# Patient Record
Sex: FEMALE | Race: BLACK OR AFRICAN AMERICAN | NOT HISPANIC OR LATINO
[De-identification: names, ages, dates, MRNs, and addresses within clinical notes are randomized per-mention and may not be internally consistent; named-entity substitution may affect disease eponyms.]

---

## 2021-05-05 PROBLEM — Z00.00 ENCOUNTER FOR PREVENTIVE HEALTH EXAMINATION: Status: ACTIVE | Noted: 2021-05-05

## 2021-05-11 ENCOUNTER — APPOINTMENT (OUTPATIENT)
Dept: PLASTIC SURGERY | Facility: CLINIC | Age: 36
End: 2021-05-11

## 2023-12-23 ENCOUNTER — EMERGENCY (EMERGENCY)
Facility: HOSPITAL | Age: 38
LOS: 1 days | Discharge: HOME | End: 2023-12-23
Attending: STUDENT IN AN ORGANIZED HEALTH CARE EDUCATION/TRAINING PROGRAM | Admitting: STUDENT IN AN ORGANIZED HEALTH CARE EDUCATION/TRAINING PROGRAM
Payer: COMMERCIAL

## 2023-12-23 VITALS
HEART RATE: 105 BPM | HEIGHT: 63 IN | RESPIRATION RATE: 18 BRPM | TEMPERATURE: 101 F | WEIGHT: 199.96 LBS | OXYGEN SATURATION: 99 % | DIASTOLIC BLOOD PRESSURE: 83 MMHG | SYSTOLIC BLOOD PRESSURE: 128 MMHG

## 2023-12-23 DIAGNOSIS — Z98.891 HISTORY OF UTERINE SCAR FROM PREVIOUS SURGERY: Chronic | ICD-10-CM

## 2023-12-23 DIAGNOSIS — U07.1 COVID-19: ICD-10-CM

## 2023-12-23 DIAGNOSIS — J06.9 ACUTE UPPER RESPIRATORY INFECTION, UNSPECIFIED: ICD-10-CM

## 2023-12-23 DIAGNOSIS — R05.9 COUGH, UNSPECIFIED: ICD-10-CM

## 2023-12-23 LAB
RAPID RVP RESULT: DETECTED
RAPID RVP RESULT: DETECTED
SARS-COV-2 RNA SPEC QL NAA+PROBE: DETECTED
SARS-COV-2 RNA SPEC QL NAA+PROBE: DETECTED

## 2023-12-23 PROCEDURE — 0225U NFCT DS DNA&RNA 21 SARSCOV2: CPT

## 2023-12-23 PROCEDURE — 99283 EMERGENCY DEPT VISIT LOW MDM: CPT

## 2023-12-23 RX ORDER — ACETAMINOPHEN 500 MG
1000 TABLET ORAL ONCE
Refills: 0 | Status: COMPLETED | OUTPATIENT
Start: 2023-12-23 | End: 2023-12-23

## 2023-12-23 RX ADMIN — Medication 1000 MILLIGRAM(S): at 21:54

## 2023-12-23 NOTE — ED PROVIDER NOTE - OBJECTIVE STATEMENT
39 yo fem without signif pmhx c/o cough and congestion.  States daughter with URI/cough for past week and tested neg for Covid 3 days ago.  The patient began to develop cough, nasal congestion and feeling feverish 2 days ago; vomited once yesterday and once the previous day.  NB/NB emesis. No diarrhea, melena, or abd pain.  No chest pain, except for mild discomfort when she coughs.  The cough has been essentially nonproductive, except for one episode of clear sputum in the morning, no hemoptysis.  No shortness of breath. No hx of heart disease, lung disease, DVT/PE, HLD.  States she possibly has HTN, but that iit is being watched, not on BP meds.  Hx left ankle surgery last spring, has some exacerbation of the left ankle pain, but no LE edema or calf pain.  Has not taken anything for symptoms.  She denies any possibility of pregnancy.

## 2023-12-23 NOTE — ED PROVIDER NOTE - NSFOLLOWUPINSTRUCTIONS_ED_ALL_ED_FT
You can take acetaminophen (Tylenol) or ibuprofen (Advil/Motrin) if needed for pain/fever.     Since daughter has Covid, we must assume that you have it as well.  Rest at home, isolation for at least 5 days and until symptoms improving, and no fever without the use of fever-reducing meds (ie acetaminophen, ibuprofen, naproxen, diclofenac).     Stay well-hydrated.    Follow up with your doctor after the weekend.  Return to the Emergency Department if you have any new or worsening symptoms, or if you have any concerns.  =============  Viral Illness, Adult  Viruses are tiny germs that can get into a person's body and cause illness. There are many different types of viruses, and they cause many types of illness. Viral illnesses can range from mild to severe. They can affect various parts of the body.    Short-term conditions that are caused by a virus include colds and the flu (influenza). Long-term conditions that are caused by a virus include herpes, shingles, and HIV (human immunodeficiency virus) infection. A few viruses have been linked to certain cancers.    What are the causes?  Many types of viruses can cause illness. Viruses invade cells in your body, multiply, and cause the infected cells to work abnormally or die. When these cells die, they release more of the virus. When this happens, you develop symptoms of the illness, and the virus continues to spread to other cells. If the virus takes over the function of the cell, it can cause the cell to divide and grow out of control. This happens when a virus causes cancer.    Different viruses get into the body in different ways. You can get a virus by:  Swallowing food or water that has come in contact with the virus (is contaminated).  Breathing in droplets that have been coughed or sneezed into the air by an infected person.  Touching a surface that has been contaminated with the virus and then touching your eyes, nose, or mouth.  Being bitten by an insect or animal that carries the virus.  Having sexual contact with a person who is infected with the virus.  Being exposed to blood or fluids that contain the virus, either through an open cut or during a transfusion.  If a virus enters your body, your body's defense system (immune system) will try to fight the virus. You may be at higher risk for a viral illness if your immune system is weak.    What are the signs or symptoms?  You may have these symptoms, depending on the type of virus and the location of the cells that it invades:  Cold and flu viruses:  Fever.  Headache.  Sore throat.  Muscle aches.  Stuffy nose (nasal congestion).  Cough.  Digestive system (gastrointestinal) viruses:  Fever.  Pain in the abdomen.  Nausea.  Diarrhea.  Liver viruses (hepatitis):  Loss of appetite.  Tiredness.  Skin or the white parts of your eyes turning yellow (jaundice).  Brain and spinal cord viruses:  Fever.  Headache.  Stiff neck.  Nausea and vomiting.  Confusion or sleepiness.  Skin viruses:  Warts.  Itching.  Rash.  Sexually transmitted viruses:  Discharge.  Swelling.  Redness.  Rash.  How is this diagnosed?  This condition may be diagnosed based on one or more of the following:  Symptoms.  Medical history.  Physical exam.  Blood test, sample of mucus from your lungs (sputum sample), stool sample, or a swab of body fluids or a skin sore (lesion).  How is this treated?  Viruses can be hard to treat because they live within cells. Antibiotic medicines do not treat viruses because these medicines do not get inside cells. Treatment for a viral illness may include:  Resting and drinking plenty of fluids.  Medicines to relieve symptoms. These can include over-the-counter medicine for pain and fever, medicines for cough or congestion, and medicines to relieve diarrhea.  Antiviral medicines. These medicines are available only for certain types of viruses.  Some viral illnesses can be prevented with vaccinations. A common example is the flu shot.    Follow these instructions at home:  Medicines    Take over-the-counter and prescription medicines only as told by your health care provider.  If you were prescribed an antiviral medicine, take it as told by your health care provider. Do not stop taking the antiviral even if you start to feel better.  Be aware of when antibiotics are needed and when they are not needed. Antibiotics do not treat viruses. You may get an antibiotic if your health care provider thinks that you may have, or are at risk for, a bacterial infection and you have a viral infection.  Do not ask for an antibiotic prescription if you have been diagnosed with a viral illness. Antibiotics will not make your illness go away faster.  Frequently taking antibiotics when they are not needed can lead to antibiotic resistance. When this develops, the medicine no longer works against the bacteria that it normally fights.  General instructions      Drink enough fluids to keep your urine pale yellow.  Rest as much as possible.  Return to your normal activities as told by your health care provider. Ask your health care provider what activities are safe for you.  Keep all follow-up visits as told by your health care provider. This is important.  How is this prevented?    To reduce your risk of viral illness:  Wash your hands often with soap and water for at least 20 seconds. If soap and water are not available, use hand .  Avoid touching your nose, eyes, and mouth, especially if you have not washed your hands recently.  If anyone in your household has a viral infection, clean all household surfaces that may have been in contact with the virus. Use soap and hot water. You may also use bleach that you have added water to (diluted).  Stay away from people who are sick with symptoms of a viral infection.  Do not share items such as toothbrushes and water bottles with other people.  Keep your vaccinations up to date. This includes getting a yearly flu shot.  Eat a healthy diet and get plenty of rest.  Contact a health care provider if:  You have symptoms of a viral illness that do not go away.  Your symptoms come back after going away.  Your symptoms get worse.  Get help right away if you have:  Trouble breathing.  A severe headache or a stiff neck.  Severe vomiting or pain in your abdomen.  These symptoms may represent a serious problem that is an emergency. Do not wait to see if the symptoms will go away. Get medical help right away. Call your local emergency services (911 in the [place] Do not drive yourself to the hospital.    Summary  Viruses are types of germs that can get into a person's body and cause illness. Viral illnesses can range from mild to severe. They can affect various parts of the body.  Viruses can be hard to treat. There are medicines to relieve symptoms, and there are some antiviral medicines.  If you were prescribed an antiviral medicine, take it as told by your health care provider. Do not stop taking the antiviral even if you start to feel better.  Contact a health care provider if you have symptoms of a viral illness that do not go away.  This information is not intended to replace advice given to you by your health care provider. Make sure you discuss any questions you have with your health care provider.

## 2023-12-23 NOTE — ED PROVIDER NOTE - NS ED ATTENDING STATEMENT MOD
This was a shared visit with the MITZI. I reviewed and verified the documentation and independently performed the documented:

## 2023-12-23 NOTE — ED ADULT NURSE NOTE - NSFALLUNIVINTERV_ED_ALL_ED
Bed/Stretcher in lowest position, wheels locked, appropriate side rails in place/Call bell, personal items and telephone in reach/Instruct patient to call for assistance before getting out of bed/chair/stretcher/Non-slip footwear applied when patient is off stretcher/[place] to call system/Physically safe environment - no spills, clutter or unnecessary equipment/Purposeful proactive rounding/Room/bathroom lighting operational, light cord in reach Bed/Stretcher in lowest position, wheels locked, appropriate side rails in place/Call bell, personal items and telephone in reach/Instruct patient to call for assistance before getting out of bed/chair/stretcher/Non-slip footwear applied when patient is off stretcher/[place] to call system/Physically safe environment - no spills, clutter or unnecessary equipment/Purposeful proactive rounding/Room/bathroom lighting operational, light cord in reach

## 2023-12-23 NOTE — ED PROVIDER NOTE - PATIENT PORTAL LINK FT
You can access the FollowMyHealth Patient Portal offered by [facility] by registering at the following website: [email or web address]. By joining [employer]’s FollowMyHealth portal, you will also be able to view your health information using other applications (apps) compatible with our system. You can access the FollowMyHealth Patient Portal offered by [facility] by registering at the following website: [email or web address]. By joining [employer]’s FollowMyHealth portal, you will also be able to view your health information using other applications (apps) compatible with our system.

## 2023-12-23 NOTE — ED PROVIDER NOTE - PHYSICAL EXAMINATION
CONSTITUTIONAL: NAD   SKIN: Normal color and turgor.    HEAD: NC/AT.  EYES: Conjunctiva clear. Anicteric sclera.  ENT: Airway clear. Normal voice.   RESPIRATORY:  Normal work of breathing. Lungs CTAB.  CARDIOVASCULAR:  RRR, S1S2. No M/R/G.      GI:  Abdomen soft, nontender.    MSK: Neck supple.  Healed surgical scars to left ankle, minimal ankle swelling, no erythema/warmth/limited ROM.  No calf tenderness or edema.   NEURO: Alert; CN: grossly intact. Speech clear.  PIMENTEL. Gait steady.

## 2023-12-23 NOTE — ED ADULT NURSE NOTE - OBJECTIVE STATEMENT
38 y f, complaining of cough x 2 days, no medical hx, endorsed her daughter recently diagnosed with covid, and pt has run out of covid test, would like to get tested. denies chest pain, sob, nausea, vomiting. febrile in triage. pt appears well.

## 2025-01-23 ENCOUNTER — EMERGENCY (EMERGENCY)
Facility: HOSPITAL | Age: 40
LOS: 1 days | Discharge: HOME | End: 2025-01-23
Admitting: STUDENT IN AN ORGANIZED HEALTH CARE EDUCATION/TRAINING PROGRAM
Payer: SELF-PAY

## 2025-01-23 VITALS
TEMPERATURE: 101 F | DIASTOLIC BLOOD PRESSURE: 78 MMHG | WEIGHT: 190.04 LBS | RESPIRATION RATE: 18 BRPM | OXYGEN SATURATION: 97 % | SYSTOLIC BLOOD PRESSURE: 130 MMHG | HEIGHT: 63 IN | HEART RATE: 105 BPM

## 2025-01-23 DIAGNOSIS — Z98.891 HISTORY OF UTERINE SCAR FROM PREVIOUS SURGERY: Chronic | ICD-10-CM

## 2025-01-23 LAB
FLUAV AG NPH QL: DETECTED
FLUBV AG NPH QL: SIGNIFICANT CHANGE UP
RSV RNA NPH QL NAA+NON-PROBE: SIGNIFICANT CHANGE UP
SARS-COV-2 RNA SPEC QL NAA+PROBE: SIGNIFICANT CHANGE UP

## 2025-01-23 PROCEDURE — 71046 X-RAY EXAM CHEST 2 VIEWS: CPT | Mod: 26

## 2025-01-23 PROCEDURE — 99284 EMERGENCY DEPT VISIT MOD MDM: CPT

## 2025-01-23 PROCEDURE — 99283 EMERGENCY DEPT VISIT LOW MDM: CPT | Mod: 25

## 2025-01-23 PROCEDURE — 87637 SARSCOV2&INF A&B&RSV AMP PRB: CPT

## 2025-01-23 PROCEDURE — 71046 X-RAY EXAM CHEST 2 VIEWS: CPT

## 2025-01-23 RX ORDER — ONDANSETRON 4 MG/1
1 TABLET ORAL
Qty: 1 | Refills: 0
Start: 2025-01-23

## 2025-01-23 RX ORDER — ACETAMINOPHEN 80 MG/.8ML
975 SOLUTION/ DROPS ORAL ONCE
Refills: 0 | Status: COMPLETED | OUTPATIENT
Start: 2025-01-23 | End: 2025-01-23

## 2025-01-23 RX ORDER — ONDANSETRON 4 MG/1
4 TABLET ORAL ONCE
Refills: 0 | Status: COMPLETED | OUTPATIENT
Start: 2025-01-23 | End: 2025-01-23

## 2025-01-23 RX ADMIN — ACETAMINOPHEN 975 MILLIGRAM(S): 80 SOLUTION/ DROPS ORAL at 13:53

## 2025-01-23 RX ADMIN — ONDANSETRON 4 MILLIGRAM(S): 4 TABLET ORAL at 13:54

## 2025-01-23 NOTE — ED ADULT NURSE NOTE - CHIEF COMPLAINT QUOTE
Cough, flu-like symptoms including intermittent fever x 2-3 days. Recent trip to [place] wherein symptoms developed. Alert, oriented, and ambulatory at time of assessment. Denies any antipyretic use. Denies sore throat. Denies chest pain or SOB. Reports episode of posttusive emesis yesterday now resolved. Alert, oriented, and ambulatory at time of triage.

## 2025-01-23 NOTE — ED PROVIDER NOTE - NSFOLLOWUPINSTRUCTIONS_ED_ALL_ED_FT
Influenza    WHAT YOU NEED TO KNOW:    Influenza (the flu) is a viral infection of the lungs and airways. The virus spreads through droplets in the air when someone with flu coughs or sneezes. You may also get the virus through close contact with someone who has the flu. For example, a person with the virus on his or her hands can spread it by shaking hands with someone. You may spread the flu to others for 1 week or longer after signs or symptoms appear.    DISCHARGE INSTRUCTIONS:    Call your local emergency number (911 in the ) if:    You have trouble breathing, and your lips look purple or blue.    You have a seizure.  Return to the emergency department if:    You are dizzy, or you are urinating less or not at all.    You have a headache with a stiff neck, and you feel tired or confused.    You have new pain or pressure in your chest.    Your symptoms, such as shortness of breath, vomiting, or diarrhea, get worse.    Your symptoms, such as fever and coughing, seem to get better, but then get worse.  Call your doctor if:    You have new muscle pain or weakness.    You have questions or concerns about your condition or care.  Medicines: You may need any of the following:    Acetaminophen decreases pain and fever. It is available without a doctor's order. Ask how much to take and how often to take it. Follow directions. Read the labels of all other medicines you are using to see if they also contain acetaminophen, or ask your doctor or pharmacist. Acetaminophen can cause liver damage if not taken correctly.    NSAIDs, such as ibuprofen, help decrease swelling, pain, and fever. This medicine is available with or without a doctor's order. NSAIDs can cause stomach bleeding or kidney problems in certain people. If you take blood thinner medicine, always ask your healthcare provider if NSAIDs are safe for you. Always read the medicine label and follow directions.    Antivirals help treat viral infections.    Take your medicine as directed. Contact your healthcare provider if you think your medicine is not helping or if you have side effects. Tell your provider if you are allergic to any medicine. Keep a list of the medicines, vitamins, and herbs you take. Include the amounts, and when and why you take them. Bring the list or the pill bottles to follow-up visits. Carry your medicine list with you in case of an emergency.  Rest as much as you can to help you recover.    Drink liquids as directed to help prevent dehydration. Ask how much liquid to drink each day and which liquids are best for you.    Gargle with warm salt water to soothe your sore throat. Your healthcare provider may also recommend throat lozenges or sprays.    Use a cool mist humidifier or vaporizer to ease your breathing and unplug your nose.  Humidifier    Prevent the spread of germs:      Wash your hands often. Wash your hands several times each day. Wash after you use the bathroom, change a child's diaper, and before you prepare or eat food. Use soap and water every time. Rub your soapy hands together, lacing your fingers. Wash the front and back of your hands, and in between your fingers. Use the fingers of one hand to scrub under the fingernails of the other hand. Wash for at least 20 seconds. Rinse with warm, running water for several seconds. Then dry your hands with a clean towel or paper towel. Use hand  that contains alcohol if soap and water are not available. Do not touch your eyes, nose, or mouth without washing your hands first.  Handwashing      Cover a sneeze or cough. Use a tissue that covers your mouth and nose. Throw the tissue away in a trash can right away. Use the bend of your arm if a tissue is not available. Wash your hands well with soap and water or use hand .    Stay home if you are sick. Avoid crowds during the first 1 to 4 days of illness.    Ask about vaccines you may need. Talk to your healthcare provider about your vaccine history. Your provider can tell you which vaccines you need, and when to get them.  Get the flu vaccine as soon as recommended. The vaccine may be available starting in September or October. Flu viruses change, so it is important to get a flu vaccine every year.    Get a COVID-19 vaccine as recommended. Vaccination is recommended for everyone 6 months or older. Your provider can tell you when and how often to get booster doses.    Get the pneumonia vaccine if recommended. This vaccine is usually recommended every 5 years. Your provider will tell you when to get this vaccine, if needed.  Follow up with your doctor as directed: Write down your questions so you remember to ask them during your visits.

## 2025-01-23 NOTE — ED ADULT TRIAGE NOTE - CHIEF COMPLAINT QUOTE
Cough, flu-like symptoms including intermittent fever x 2-3 days. Recent trip to [place] wherein symptoms developed. Alert, oriented, and ambulatory at time of assessment. Denies any antipyretic use. Denies sore throat. Denies chest pain or SOB. Reports episode of posttusive emesis yesterday now resolved. Alert, oriented, and ambulatory at time of triage.

## 2025-01-23 NOTE — ED PROVIDER NOTE - OBJECTIVE STATEMENT
41 y/o f presents c/o cough, congestion, subjective fever x 3 days.  Pt stating she also vomited once today, still feeling mildly nauseous.  Pt stating her cough started about 1 week ago but didn't have fever or feel ill until 3 days ago.  Denies CP, SOB, abd pain, diarrhea, urinary sx, all other ROS negative.

## 2025-01-23 NOTE — ED ADULT NURSE NOTE - OBJECTIVE STATEMENT
Pt is a 41y/o F denies PMHx presenting to the ED w/ c/o of cough, nasal congestion, intermittent fevers, body aches, chills x2-3days, recently returned from [place] on 21st. Endorses 3x episodes of vomiting following coughing fit. Denies abd pain, diarrhea, urinary symptoms. Did NOT medicate @ home today PTA. Pt A/Ox3, speaking in clear/complete sentences. Respirations easy/even and unlabored on RA. Pt ambulates independently w/ steady gait. Pt resting comfortably in chair, no acute distress. Pending ED provider eval.

## 2025-01-23 NOTE — ED PROVIDER NOTE - CLINICAL SUMMARY MEDICAL DECISION MAKING FREE TEXT BOX
39 y/o f presents c/o fever, cough, congestion x 3 days.  Temp in .6, nontoxic appearing on exam, no resp distress.  Flu + on swab, cxr negative, nausea improved with PO zofran.  Pt tolerating PO, will d/c, continue supportive care, f/u pmd, return to ED if sx worsen.

## 2025-01-23 NOTE — ED PROVIDER NOTE - PATIENT PORTAL LINK FT
You can access the FollowMyHealth Patient Portal offered by [facility] by registering at the following website: [email or web address]. By joining [employer]’s FollowMyHealth portal, you will also be able to view your health information using other applications (apps) compatible with our system.

## 2025-01-27 DIAGNOSIS — R05.1 ACUTE COUGH: ICD-10-CM

## 2025-01-27 DIAGNOSIS — J11.1 INFLUENZA DUE TO UNIDENTIFIED INFLUENZA VIRUS WITH OTHER RESPIRATORY MANIFESTATIONS: ICD-10-CM

## 2025-03-05 ENCOUNTER — EMERGENCY (EMERGENCY)
Facility: HOSPITAL | Age: 40
LOS: 1 days | Discharge: HOME | End: 2025-03-05
Admitting: EMERGENCY MEDICINE
Payer: COMMERCIAL

## 2025-03-05 VITALS
RESPIRATION RATE: 18 BRPM | OXYGEN SATURATION: 99 % | WEIGHT: 190.04 LBS | HEART RATE: 93 BPM | DIASTOLIC BLOOD PRESSURE: 97 MMHG | TEMPERATURE: 98 F | SYSTOLIC BLOOD PRESSURE: 150 MMHG | HEIGHT: 63 IN

## 2025-03-05 DIAGNOSIS — Z98.891 HISTORY OF UTERINE SCAR FROM PREVIOUS SURGERY: Chronic | ICD-10-CM

## 2025-03-05 PROCEDURE — 96372 THER/PROPH/DIAG INJ SC/IM: CPT

## 2025-03-05 PROCEDURE — 73610 X-RAY EXAM OF ANKLE: CPT | Mod: 26,50

## 2025-03-05 PROCEDURE — 99284 EMERGENCY DEPT VISIT MOD MDM: CPT

## 2025-03-05 PROCEDURE — 73610 X-RAY EXAM OF ANKLE: CPT

## 2025-03-05 PROCEDURE — 99283 EMERGENCY DEPT VISIT LOW MDM: CPT | Mod: 25

## 2025-03-05 RX ORDER — KETOROLAC TROMETHAMINE 30 MG/ML
15 INJECTION, SOLUTION INTRAMUSCULAR; INTRAVENOUS ONCE
Refills: 0 | Status: DISCONTINUED | OUTPATIENT
Start: 2025-03-05 | End: 2025-03-05

## 2025-03-05 RX ADMIN — KETOROLAC TROMETHAMINE 15 MILLIGRAM(S): 30 INJECTION, SOLUTION INTRAMUSCULAR; INTRAVENOUS at 18:57

## 2025-03-07 DIAGNOSIS — Z96.662 PRESENCE OF LEFT ARTIFICIAL ANKLE JOINT: ICD-10-CM
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
## 2025-03-07 DIAGNOSIS — M25.571 PAIN IN RIGHT ANKLE AND JOINTS OF RIGHT FOOT: ICD-10-CM
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
## 2025-03-07 DIAGNOSIS — M79.89 OTHER SPECIFIED SOFT TISSUE DISORDERS: ICD-10-CM

## 2025-04-19 ENCOUNTER — EMERGENCY (EMERGENCY)
Facility: HOSPITAL | Age: 40
LOS: 1 days | End: 2025-04-19
Attending: EMERGENCY MEDICINE | Admitting: EMERGENCY MEDICINE
Payer: COMMERCIAL

## 2025-04-19 VITALS
HEART RATE: 75 BPM | OXYGEN SATURATION: 99 % | RESPIRATION RATE: 16 BRPM | DIASTOLIC BLOOD PRESSURE: 87 MMHG | WEIGHT: 199.96 LBS | TEMPERATURE: 98 F | HEIGHT: 63 IN | SYSTOLIC BLOOD PRESSURE: 156 MMHG

## 2025-04-19 DIAGNOSIS — Z98.891 HISTORY OF UTERINE SCAR FROM PREVIOUS SURGERY: Chronic | ICD-10-CM

## 2025-04-19 LAB
APPEARANCE UR: ABNORMAL
BILIRUB UR-MCNC: NEGATIVE — SIGNIFICANT CHANGE UP
COLOR SPEC: YELLOW — SIGNIFICANT CHANGE UP
DIFF PNL FLD: ABNORMAL
GLUCOSE UR QL: NEGATIVE MG/DL — SIGNIFICANT CHANGE UP
KETONES UR-MCNC: ABNORMAL MG/DL
LEUKOCYTE ESTERASE UR-ACNC: ABNORMAL
NITRITE UR-MCNC: NEGATIVE — SIGNIFICANT CHANGE UP
PH UR: 5.5 — SIGNIFICANT CHANGE UP (ref 5–8)
PROT UR-MCNC: SIGNIFICANT CHANGE UP MG/DL
SP GR SPEC: 1.02 — SIGNIFICANT CHANGE UP (ref 1–1.03)
UROBILINOGEN FLD QL: 1 MG/DL — SIGNIFICANT CHANGE UP (ref 0.2–1)

## 2025-04-19 PROCEDURE — 81001 URINALYSIS AUTO W/SCOPE: CPT

## 2025-04-19 PROCEDURE — 99283 EMERGENCY DEPT VISIT LOW MDM: CPT

## 2025-04-19 PROCEDURE — 99284 EMERGENCY DEPT VISIT MOD MDM: CPT

## 2025-04-19 PROCEDURE — 87086 URINE CULTURE/COLONY COUNT: CPT

## 2025-04-19 RX ORDER — ONDANSETRON HCL/PF 4 MG/2 ML
4 VIAL (ML) INJECTION ONCE
Refills: 0 | Status: COMPLETED | OUTPATIENT
Start: 2025-04-19 | End: 2025-04-19

## 2025-04-19 RX ADMIN — Medication 4 MILLIGRAM(S): at 17:15

## 2025-04-19 NOTE — ED ADULT TRIAGE NOTE - CHIEF COMPLAINT QUOTE
Pt reports bilateral quadrant lower abdominal pain intermittently x several days. Pt additionally noticed x 1 episode of light vaginal spotting PTA. Denies dysuria. Endorses intermittent fevers and intermittent nausea. Denies pelvic pain. Denies chest pain or SOB. Otherwise alert, oriented, and ambulatory at time of triage.

## 2025-04-19 NOTE — ED PROVIDER NOTE - PATIENT PORTAL LINK FT
You can access the FollowMyHealth Patient Portal offered by [facility] by registering at the following website: [email or web address]. By joining [employer]’s FollowMyHealth portal, you will also be able to view your health information using other applications (apps) compatible with our system.

## 2025-04-19 NOTE — ED PROVIDER NOTE - OBJECTIVE STATEMENT
41 y/o f presents c/o vaginal spotting over the past 3 days, nausea.  Pt stating she had her period 2 weeks ago and wouldn't expect to be bleeding which concerned her, also feeling nauseous and vomited once yesterday while brushing her teeth.  Denies fever, chills, abd pain, diarrhea, dysuria, all other ROS negative.

## 2025-04-19 NOTE — ED PROVIDER NOTE - ATTENDING APP SHARED VISIT CONTRIBUTION OF CARE
39 y/o f presents c/o vaginal spotting over the past 3 days, nausea.  Pt stating she had her period 2 weeks ago and wouldn't expect to be bleeding which concerned her, also feeling nauseous and vomited once yesterday while brushing her teeth.  Denies fever, chills, abd pain, diarrhea, dysuria, all other ROS negative.    Vitals in ED unremarkable, nontender abd on exam.  HCG neg, no indication for further w/u at this time, to f/u with her gyn, given return precautions for worsening symptoms.    Addendum to attending statement: I have reviewed the ACP note and agree with the history, exam, and plan of care. I  was available to PA   as a supervising provider if needed. PA given opportunity to ask questions and request further evaluation / care.    Pt well appearing in ED  Concern for spotting and possible pregnancy  Neg pregnancy in ED  Understands it could be related to fibroids  Will follow up with GYN as outpt  Understands dc and follow up

## 2025-04-19 NOTE — ED ADULT NURSE NOTE - OBJECTIVE STATEMENT
40yF presents to the ER complaining of lower abdominal pain. Pain is intermittent xdays. Endorses one episode of vaginal bleeding. Endorses intermittent fever and nausea. Denies CP/SOB, N/V/D.

## 2025-04-19 NOTE — ED PROVIDER NOTE - CLINICAL SUMMARY MEDICAL DECISION MAKING FREE TEXT BOX
41 y/o f presents c/o vaginal spotting over the past few days, nausea.  Vitals in ED unremarkable, nontender abd on exam.  HCG neg, no indication for further w/u at this time, to f/u with her gyn, given return precautions for worsening symptoms.

## 2025-04-22 DIAGNOSIS — N93.9 ABNORMAL UTERINE AND VAGINAL BLEEDING, UNSPECIFIED: ICD-10-CM

## 2025-04-22 DIAGNOSIS — R11.2 NAUSEA WITH VOMITING, UNSPECIFIED: ICD-10-CM

## 2025-04-22 DIAGNOSIS — R11.0 NAUSEA: ICD-10-CM

## 2025-05-13 ENCOUNTER — EMERGENCY (EMERGENCY)
Facility: HOSPITAL | Age: 40
LOS: 1 days | End: 2025-05-13
Attending: EMERGENCY MEDICINE | Admitting: EMERGENCY MEDICINE
Payer: COMMERCIAL

## 2025-05-13 VITALS
HEIGHT: 63 IN | RESPIRATION RATE: 18 BRPM | TEMPERATURE: 98 F | SYSTOLIC BLOOD PRESSURE: 154 MMHG | DIASTOLIC BLOOD PRESSURE: 80 MMHG | WEIGHT: 199.96 LBS | OXYGEN SATURATION: 100 % | HEART RATE: 84 BPM

## 2025-05-13 VITALS
OXYGEN SATURATION: 99 % | HEART RATE: 72 BPM | TEMPERATURE: 98 F | SYSTOLIC BLOOD PRESSURE: 103 MMHG | DIASTOLIC BLOOD PRESSURE: 64 MMHG | RESPIRATION RATE: 18 BRPM

## 2025-05-13 DIAGNOSIS — Z98.891 HISTORY OF UTERINE SCAR FROM PREVIOUS SURGERY: Chronic | ICD-10-CM

## 2025-05-13 LAB
ALBUMIN SERPL ELPH-MCNC: 4 G/DL — SIGNIFICANT CHANGE UP (ref 3.3–5)
ALP SERPL-CCNC: 96 U/L — SIGNIFICANT CHANGE UP (ref 40–120)
ALT FLD-CCNC: 9 U/L — LOW (ref 10–45)
ANION GAP SERPL CALC-SCNC: 12 MMOL/L — SIGNIFICANT CHANGE UP (ref 5–17)
APPEARANCE UR: CLEAR — SIGNIFICANT CHANGE UP
AST SERPL-CCNC: 15 U/L — SIGNIFICANT CHANGE UP (ref 10–40)
BASOPHILS # BLD AUTO: 0.03 K/UL — SIGNIFICANT CHANGE UP (ref 0–0.2)
BASOPHILS NFR BLD AUTO: 0.4 % — SIGNIFICANT CHANGE UP (ref 0–2)
BILIRUB SERPL-MCNC: 0.4 MG/DL — SIGNIFICANT CHANGE UP (ref 0.2–1.2)
BILIRUB UR-MCNC: NEGATIVE — SIGNIFICANT CHANGE UP
BUN SERPL-MCNC: 13 MG/DL — SIGNIFICANT CHANGE UP (ref 7–23)
CALCIUM SERPL-MCNC: 9.4 MG/DL — SIGNIFICANT CHANGE UP (ref 8.4–10.5)
CHLORIDE SERPL-SCNC: 108 MMOL/L — SIGNIFICANT CHANGE UP (ref 96–108)
CO2 SERPL-SCNC: 22 MMOL/L — SIGNIFICANT CHANGE UP (ref 22–31)
COLOR SPEC: YELLOW — SIGNIFICANT CHANGE UP
CREAT SERPL-MCNC: 0.78 MG/DL — SIGNIFICANT CHANGE UP (ref 0.5–1.3)
DIFF PNL FLD: NEGATIVE — SIGNIFICANT CHANGE UP
EGFR: 98 ML/MIN/1.73M2 — SIGNIFICANT CHANGE UP
EGFR: 98 ML/MIN/1.73M2 — SIGNIFICANT CHANGE UP
EOSINOPHIL # BLD AUTO: 0.07 K/UL — SIGNIFICANT CHANGE UP (ref 0–0.5)
EOSINOPHIL NFR BLD AUTO: 1 % — SIGNIFICANT CHANGE UP (ref 0–6)
GLUCOSE SERPL-MCNC: 100 MG/DL — HIGH (ref 70–99)
GLUCOSE UR QL: NEGATIVE MG/DL — SIGNIFICANT CHANGE UP
HCG SERPL-ACNC: <1 MIU/ML — SIGNIFICANT CHANGE UP
HCT VFR BLD CALC: 36.9 % — SIGNIFICANT CHANGE UP (ref 34.5–45)
HGB BLD-MCNC: 12.3 G/DL — SIGNIFICANT CHANGE UP (ref 11.5–15.5)
IMM GRANULOCYTES NFR BLD AUTO: 0.1 % — SIGNIFICANT CHANGE UP (ref 0–0.9)
KETONES UR QL: NEGATIVE MG/DL — SIGNIFICANT CHANGE UP
LEUKOCYTE ESTERASE UR-ACNC: ABNORMAL
LIDOCAIN IGE QN: 26 U/L — SIGNIFICANT CHANGE UP (ref 7–60)
LYMPHOCYTES # BLD AUTO: 2.46 K/UL — SIGNIFICANT CHANGE UP (ref 1–3.3)
LYMPHOCYTES # BLD AUTO: 33.6 % — SIGNIFICANT CHANGE UP (ref 13–44)
MCHC RBC-ENTMCNC: 27.3 PG — SIGNIFICANT CHANGE UP (ref 27–34)
MCHC RBC-ENTMCNC: 33.3 G/DL — SIGNIFICANT CHANGE UP (ref 32–36)
MCV RBC AUTO: 81.8 FL — SIGNIFICANT CHANGE UP (ref 80–100)
MONOCYTES # BLD AUTO: 0.61 K/UL — SIGNIFICANT CHANGE UP (ref 0–0.9)
MONOCYTES NFR BLD AUTO: 8.3 % — SIGNIFICANT CHANGE UP (ref 2–14)
NEUTROPHILS # BLD AUTO: 4.15 K/UL — SIGNIFICANT CHANGE UP (ref 1.8–7.4)
NEUTROPHILS NFR BLD AUTO: 56.6 % — SIGNIFICANT CHANGE UP (ref 43–77)
NITRITE UR-MCNC: NEGATIVE — SIGNIFICANT CHANGE UP
NRBC BLD AUTO-RTO: 0 /100 WBCS — SIGNIFICANT CHANGE UP (ref 0–0)
PH UR: 5.5 — SIGNIFICANT CHANGE UP (ref 5–8)
PLATELET # BLD AUTO: 208 K/UL — SIGNIFICANT CHANGE UP (ref 150–400)
POTASSIUM SERPL-MCNC: 3.8 MMOL/L — SIGNIFICANT CHANGE UP (ref 3.5–5.3)
POTASSIUM SERPL-SCNC: 3.8 MMOL/L — SIGNIFICANT CHANGE UP (ref 3.5–5.3)
PROT SERPL-MCNC: 7.4 G/DL — SIGNIFICANT CHANGE UP (ref 6–8.3)
PROT UR-MCNC: NEGATIVE MG/DL — SIGNIFICANT CHANGE UP
RBC # BLD: 4.51 M/UL — SIGNIFICANT CHANGE UP (ref 3.8–5.2)
RBC # FLD: 15.1 % — HIGH (ref 10.3–14.5)
SODIUM SERPL-SCNC: 142 MMOL/L — SIGNIFICANT CHANGE UP (ref 135–145)
SP GR SPEC: 1.01 — SIGNIFICANT CHANGE UP (ref 1–1.03)
UROBILINOGEN FLD QL: 1 MG/DL — SIGNIFICANT CHANGE UP (ref 0.2–1)
WBC # BLD: 7.33 K/UL — SIGNIFICANT CHANGE UP (ref 3.8–10.5)
WBC # FLD AUTO: 7.33 K/UL — SIGNIFICANT CHANGE UP (ref 3.8–10.5)

## 2025-05-13 PROCEDURE — 80053 COMPREHEN METABOLIC PANEL: CPT

## 2025-05-13 PROCEDURE — 99284 EMERGENCY DEPT VISIT MOD MDM: CPT | Mod: 25

## 2025-05-13 PROCEDURE — 83690 ASSAY OF LIPASE: CPT

## 2025-05-13 PROCEDURE — 36415 COLL VENOUS BLD VENIPUNCTURE: CPT

## 2025-05-13 PROCEDURE — 99284 EMERGENCY DEPT VISIT MOD MDM: CPT

## 2025-05-13 PROCEDURE — 96374 THER/PROPH/DIAG INJ IV PUSH: CPT

## 2025-05-13 PROCEDURE — 85025 COMPLETE CBC W/AUTO DIFF WBC: CPT

## 2025-05-13 PROCEDURE — 87086 URINE CULTURE/COLONY COUNT: CPT

## 2025-05-13 PROCEDURE — 81001 URINALYSIS AUTO W/SCOPE: CPT

## 2025-05-13 PROCEDURE — 84702 CHORIONIC GONADOTROPIN TEST: CPT

## 2025-05-13 RX ORDER — ONDANSETRON HCL/PF 4 MG/2 ML
1 VIAL (ML) INJECTION
Qty: 1 | Refills: 0
Start: 2025-05-13 | End: 2025-05-17

## 2025-05-13 RX ORDER — ONDANSETRON HCL/PF 4 MG/2 ML
4 VIAL (ML) INJECTION ONCE
Refills: 0 | Status: COMPLETED | OUTPATIENT
Start: 2025-05-13 | End: 2025-05-13

## 2025-05-13 RX ADMIN — Medication 1000 MILLILITER(S): at 02:45

## 2025-05-13 RX ADMIN — Medication 4 MILLIGRAM(S): at 02:45

## 2025-05-13 NOTE — ED PROVIDER NOTE - OBJECTIVE STATEMENT
40 yr old female, denies medical history, no surgical history, presents to the Emergency Department w n/v. woke yesterday morning with mild diffuse abd cramping, thought he was getting her period. cramping resolved. then at work felt nauseated, had few episodes of non-bloody emesis. also felt like she was urinating more frequently than normal. three episodes of nonbloody diarrhea yesterday.   no fever, uri sx, cp, sob, hematuria/ dysuria, vaginal bleeding / discharge, headache, dizziness, near-syncope / syncope. no sick contacts, no recent travel. 40 yr old female, denies medical history, no surgical history, presents to the Emergency Department w n/v. woke yesterday morning with mild diffuse abd cramping, thought she was getting her period. cramping resolved. then at work felt nauseated, had few episodes of non-bloody emesis. also felt like she was urinating more frequently than normal. three episodes of nonbloody diarrhea yesterday.   no fever, uri sx, cp, sob, hematuria/ dysuria, vaginal bleeding / discharge, headache, dizziness, near-syncope / syncope. no sick contacts, no recent travel.

## 2025-05-13 NOTE — ED ADULT NURSE NOTE - NSFALLUNIVINTERV_ED_ALL_ED
Bed/Stretcher in lowest position, wheels locked, appropriate side rails in place/Call bell, personal items and telephone in reach/Instruct patient to call for assistance before getting out of bed/chair/stretcher/Non-slip footwear applied when patient is off stretcher/[place] to call system/Physically safe environment - no spills, clutter or unnecessary equipment/Purposeful proactive rounding/Room/bathroom lighting operational, light cord in reach

## 2025-05-13 NOTE — ED ADULT NURSE NOTE - OBJECTIVE STATEMENT
Pt AOx4, respirations even and unlabored.   Pt presents s/p three episodes of nonbloody diarrhea around 2030 yesterday, one episode of nonbloody emesis and one episode of now resolved abdominal cramping early this morning.   Pt denies SOB, chest pain, dizziness, headache, visual changes, tingling. Pt endorses exertional dyspnea at baseline.   Pt undressed and placed in hospital gown.

## 2025-05-13 NOTE — ED PROVIDER NOTE - NSFOLLOWUPINSTRUCTIONS_ED_ALL_ED_FT
Drink fluids, stay hydrated.   Try a bland diet until you are feeling better. Avoid greasy and spicy foods, caffeine and alcohol.   Take the medications described below as directed for stomach upset  Take zofran as prescribed for vomiting.     Follow up with your primary care doctor within 1 week for continued evaluation.     Return to this Emergency Department if your symptoms are persistent, worsening or new symptoms arise such as chest pain, shortness of breath, uncontrollable nausea/vomiting, vomiting blood, severe abdominal pain, blood in stool or black tarry stools, or any other concerns.      ALL OF THE FOLLOWING MEDICATIONS DO NOT REQUIRE A PRESCRIPTION AND CAN BE PURCHASED IN ANY PHARMACY:    1) FAMOTIDINE 20mg one tab 2 times a day (morning and night) for 1 month    2) MYLANTA as needed as directed on bottle - another GI medication to coat the stomach and treat the symptoms, does not decrease the amount of acid in the stomach but rather just coats the stomach and will help with immediate symptom relief.

## 2025-05-13 NOTE — ED PROVIDER NOTE - PHYSICAL EXAMINATION
Constitutional : Well appearing, non-toxic, no acute distress. awake, alert, oriented to person, place, time/situation.  Head : head normocephalic, atraumatic  EENMT : eyes clear bilaterally, PERRL, EOMI. airway patent. moist mucous membranes. neck supple.  Cardiac : Normal rate, regular rhythm. No murmur appreciated, no LE edema.  Resp : Breath sounds clear and equal bilaterally. Respirations even and unlabored.   Gastro : abdomen soft, nontender, nondistended. no rebound or guarding. no CVAT.  MSK :  range of motion is not limited, no muscle or joint tenderness  Back : No evidence of trauma.   Vasc : Extremities warm and well perfused. 2+ radial and DP pulses. cap refill <2 seconds  Neuro : Alert and oriented, CNII-XII grossly intact, no motor or sensory deficits.  Skin : Skin normal color for race, warm, dry and intact.   Psych : Alert and oriented to person, place, time/situation. normal mood and affect. no apparent risk to self or others.

## 2025-05-13 NOTE — ED PROVIDER NOTE - NSICDXPASTSURGICALHX_GEN_ALL_CORE_FT
Patient meets Criteria for high risk screening colonoscopy, due to family hx as per protocol. Please call to schedule with IV sedation. Patient is not diabetic or on blood thinners.     SCREENING CRITERIA  Age: 52 year old Patient meets age criteria.  Personal H/O Colon CA or Polyps: Patient does NOT have a personal history of colon polyps or colon cancer  Family H/O Colon CA: YES - father at age 60  GI Symptoms: No    ALLERGIES:   Allergen Reactions   • Dander [Cat Dander] SHORTNESS OF BREATH   • Grass SHORTNESS OF BREATH   • Mustard SHORTNESS OF BREATH   • Peanuts [Peanut] SHORTNESS OF BREATH   • Trees SHORTNESS OF BREATH   • Gluten    • Soy Bean        Medications:  Anticoagulation (examples - coumadin, heparin, lovenox) : No  Platelet Modifying (examples - Plavix, aspirin, nsaids, Aggrenox) : No  Review of other medications indicate - no concern   BMI: Estimated body mass index is 32.42 kg/m² as calculated from the following:    Height as of an earlier encounter on 5/25/18: 5' 7\" (1.702 m).    Weight as of an earlier encounter on 5/25/18: 93.9 kg.  Most recent colon procedure No prior Flexi or Colonoscopy    Medical Hx:  Diabetes: No, patient is NOT a diabetic  Respiratory: No, patient does not have any lung problems.  Cardiac: No, patient does not have any cardiac problems.  Renal: No  Other Concerns: none noted    SCREENING RECOMMENDATIONS: Patient meets criteria.    Diagnosis code from O/A order:  Colon Cancer Screening      Anesthesia will be IV sedation based on above screening. Based on anesthesia guidelines and review, patient will need no further testing. Based on clinical history prep choices are Nulytely, Modified 1/2 Nulytely, Nulytely with Dulcolax, OsmoPrep, MoviPrep, Suprep or as per Physician preference.. Please schedule at ambulatory surgical center.     PAST SURGICAL HISTORY:  H/O  section

## 2025-05-13 NOTE — ED PROVIDER NOTE - PROGRESS NOTE DETAILS
hcg negative. labs unremarkable.   UA w/ mod leuks, nitrite negative, 6wbcs. 9 epithelials. will f/u culture given no dysuria / urgency.   after fluids / meds pt resting comfortably, tolerating po. abd exam benign and stable vitals.   ok for dc . rx for zofran sent.     All results reviewed with the patient verbally. Discharge plan and return precautions d/w pt who verbalized understanding and agrees with plan. All questions answered. Vitals WNL. Ready for d/c.

## 2025-05-13 NOTE — ED PROVIDER NOTE - PATIENT PORTAL LINK FT
You can access the FollowMyHealth Patient Portal offered by [facility] by registering at the following website: [email or web address]. By joining [facility]’s FollowMyHealth portal, you will also be able to view your health information using other applications (apps) compatible with our system.

## 2025-05-13 NOTE — ED PROVIDER NOTE - CLINICAL SUMMARY MEDICAL DECISION MAKING FREE TEXT BOX
denies medical history, no surgical history, woke yesterday morning with mild diffuse abd cramping, thought he was getting her period. cramping resolved. then at work felt nauseated, had few episodes of non-bloody emesis. also felt like she was urinating more frequently than normal. three episodes of nonbloody diarrhea yesterday. no fever.   pt well appearing, stable vitals, exam unremarkable - no abd tenderness.   possible gerd, gastritis, pud. viral illness, ?gastroenteritis, ?pancreatitis  doubt acute surgical intra-abdominal pathology ie appy, biliary pathology, sbo. defer abdominal imaging at this time, can reconsider if lab abnormalities or change in exam / no symptomatic improvement.   no concern for gyn pathology. low suspicion uti.   plan - labs, ua  trial gi meds  serial abd exams denies medical history, no surgical history, woke yesterday morning with mild diffuse abd cramping, thought she was getting her period. cramping resolved. then at work felt nauseated, had few episodes of non-bloody emesis. also felt like she was urinating more frequently than normal. three episodes of nonbloody diarrhea yesterday. no fever.   pt well appearing, stable vitals, exam unremarkable - no abd tenderness.   possible gerd, gastritis, pud. viral illness, ?gastroenteritis, ?pancreatitis  doubt acute surgical intra-abdominal pathology ie appy, biliary pathology, sbo. defer abdominal imaging at this time, can reconsider if lab abnormalities or change in exam / no symptomatic improvement.   no concern for gyn pathology. low suspicion uti.   plan - labs, ua  trial gi meds  serial abd exams

## 2025-05-13 NOTE — ED PROVIDER NOTE - ATTENDING APP SHARED VISIT CONTRIBUTION OF CARE
i discussed the care of the pt directly with the ACP while the pt was in the ED. i have reviewed the ACP note and agree w/ the history, exam and plan of care other than as noted above.    40yF no surgical history, woke yesterday morning with mild diffuse abd cramping, thought she was getting her period. cramping resolved. then at work felt nauseated, had few episodes of non-bloody emesis. also felt like she was urinating more frequently than normal. three episodes of nonbloody diarrhea yesterday. no fever.   pt well appearing, stable vitals, exam unremarkable - no abd tenderness.   possible gerd, gastritis, pud. viral illness, ?gastroenteritis, ?pancreatitis  doubt acute surgical intra-abdominal pathology

## 2025-05-16 DIAGNOSIS — R11.2 NAUSEA WITH VOMITING, UNSPECIFIED: ICD-10-CM
